# Patient Record
Sex: MALE | URBAN - METROPOLITAN AREA
[De-identification: names, ages, dates, MRNs, and addresses within clinical notes are randomized per-mention and may not be internally consistent; named-entity substitution may affect disease eponyms.]

---

## 2017-07-17 ENCOUNTER — APPOINTMENT (RX ONLY)
Dept: URBAN - METROPOLITAN AREA CLINIC 48 | Facility: CLINIC | Age: 42
Setting detail: DERMATOLOGY
End: 2017-07-17

## 2017-07-17 DIAGNOSIS — B36.0 PITYRIASIS VERSICOLOR: ICD-10-CM

## 2017-07-17 PROCEDURE — ? PRESCRIPTION

## 2017-07-17 PROCEDURE — ? TREATMENT REGIMEN

## 2017-07-17 PROCEDURE — 99202 OFFICE O/P NEW SF 15 MIN: CPT

## 2017-07-17 PROCEDURE — ? COUNSELING

## 2017-07-17 RX ORDER — FLUCONAZOLE 150 MG/1
TABLET ORAL
Qty: 4 | Refills: 0 | Status: ERX | COMMUNITY
Start: 2017-07-17

## 2017-07-17 RX ADMIN — FLUCONAZOLE: 150 TABLET ORAL at 19:40

## 2017-07-17 ASSESSMENT — LOCATION DETAILED DESCRIPTION DERM
LOCATION DETAILED: RIGHT MEDIAL UPPER BACK
LOCATION DETAILED: LEFT MEDIAL INFERIOR CHEST

## 2017-07-17 ASSESSMENT — LOCATION ZONE DERM: LOCATION ZONE: TRUNK

## 2017-07-17 ASSESSMENT — LOCATION SIMPLE DESCRIPTION DERM
LOCATION SIMPLE: RIGHT UPPER BACK
LOCATION SIMPLE: CHEST

## 2017-07-17 NOTE — PROCEDURE: MIPS QUALITY
Quality 111:Pneumonia Vaccination Status For Older Adults: Pneumococcal Vaccination not Administered or Previously Received, Reason not Otherwise Specified
Quality 226: Preventive Care And Screening: Tobacco Use: Screening And Cessation Intervention: Patient screened for tobacco and is an ex-smoker
Quality 47: Advance Care Plan: Advance care planning not documented, reason not otherwise specified.
Quality 131: Pain Assessment And Follow-Up: Pain assessment using a standardized tool is documented as negative, no follow-up plan required
Quality 110: Preventive Care And Screening: Influenza Immunization: Influenza Immunization previously received during influenza season
Detail Level: Detailed
Quality 130: Documentation Of Current Medications In The Medical Record: Current Medications Documented

## 2018-06-20 ENCOUNTER — NEW PATIENT COMPREHENSIVE (OUTPATIENT)
Dept: URBAN - METROPOLITAN AREA CLINIC 39 | Facility: CLINIC | Age: 43
End: 2018-06-20

## 2018-06-20 DIAGNOSIS — H52.4: ICD-10-CM

## 2018-06-20 DIAGNOSIS — H52.02: ICD-10-CM

## 2018-06-20 DIAGNOSIS — H04.123: ICD-10-CM

## 2018-06-20 DIAGNOSIS — H35.712: ICD-10-CM

## 2018-06-20 DIAGNOSIS — H52.203: ICD-10-CM

## 2018-06-20 PROCEDURE — 92004 COMPRE OPH EXAM NEW PT 1/>: CPT

## 2018-06-20 ASSESSMENT — VISUAL ACUITY
OD_SC: 20/25-1
OD_BAT: 20/40
OS_BAT: 20/60
OD_SC: J2
OS_SC: J3
OS_SC: 20/25

## 2018-06-20 ASSESSMENT — TONOMETRY
OS_IOP_MMHG: 17
OD_IOP_MMHG: 15

## 2018-10-16 ENCOUNTER — DILATED FUNDUS EXAM (OUTPATIENT)
Dept: URBAN - METROPOLITAN AREA CLINIC 39 | Facility: CLINIC | Age: 43
End: 2018-10-16

## 2018-10-16 DIAGNOSIS — H04.123: ICD-10-CM

## 2018-10-16 DIAGNOSIS — H35.712: ICD-10-CM

## 2018-10-16 DIAGNOSIS — H43.391: ICD-10-CM

## 2018-10-16 PROCEDURE — 92012 INTRM OPH EXAM EST PATIENT: CPT

## 2018-10-16 ASSESSMENT — VISUAL ACUITY
OS_SC: 20/20
OU_SC: 20/20
OD_SC: 20/20
OU_SC: J1
OS_SC: J2
OD_SC: J1

## 2018-10-16 ASSESSMENT — TONOMETRY
OD_IOP_MMHG: 12
OS_IOP_MMHG: 12

## 2019-04-18 ENCOUNTER — DILATED FUNDUS EXAM (OUTPATIENT)
Dept: URBAN - METROPOLITAN AREA CLINIC 39 | Facility: CLINIC | Age: 44
End: 2019-04-18

## 2019-04-18 DIAGNOSIS — H43.391: ICD-10-CM

## 2019-04-18 DIAGNOSIS — H04.123: ICD-10-CM

## 2019-04-18 DIAGNOSIS — H35.712: ICD-10-CM

## 2019-04-18 PROCEDURE — 92014 COMPRE OPH EXAM EST PT 1/>: CPT

## 2019-04-18 ASSESSMENT — TONOMETRY
OS_IOP_MMHG: 15
OD_IOP_MMHG: 15

## 2019-04-18 ASSESSMENT — VISUAL ACUITY
OD_SC: 20/20
OS_SC: J1
OU_SC: 20/20
OD_SC: J1
OU_SC: J1
OS_SC: 20/20